# Patient Record
Sex: FEMALE | Race: WHITE | Employment: FULL TIME | URBAN - METROPOLITAN AREA
[De-identification: names, ages, dates, MRNs, and addresses within clinical notes are randomized per-mention and may not be internally consistent; named-entity substitution may affect disease eponyms.]

---

## 2019-07-29 ENCOUNTER — HOSPITAL ENCOUNTER (EMERGENCY)
Facility: CLINIC | Age: 24
Discharge: HOME | End: 2019-07-29
Attending: EMERGENCY MEDICINE | Admitting: EMERGENCY MEDICINE
Payer: COMMERCIAL

## 2019-07-29 ENCOUNTER — APPOINTMENT (OUTPATIENT)
Dept: GENERAL RADIOLOGY | Facility: CLINIC | Age: 24
End: 2019-07-29
Attending: EMERGENCY MEDICINE
Payer: COMMERCIAL

## 2019-07-29 VITALS
DIASTOLIC BLOOD PRESSURE: 79 MMHG | SYSTOLIC BLOOD PRESSURE: 130 MMHG | HEART RATE: 112 BPM | TEMPERATURE: 98.8 F | OXYGEN SATURATION: 100 % | RESPIRATION RATE: 16 BRPM

## 2019-07-29 DIAGNOSIS — S93.402A SPRAIN OF LEFT ANKLE, UNSPECIFIED LIGAMENT, INITIAL ENCOUNTER: ICD-10-CM

## 2019-07-29 PROCEDURE — 99284 EMERGENCY DEPT VISIT MOD MDM: CPT

## 2019-07-29 PROCEDURE — 73630 X-RAY EXAM OF FOOT: CPT | Mod: LT

## 2019-07-29 PROCEDURE — 73610 X-RAY EXAM OF ANKLE: CPT | Mod: LT

## 2019-07-29 PROCEDURE — 25000132 ZZH RX MED GY IP 250 OP 250 PS 637: Performed by: EMERGENCY MEDICINE

## 2019-07-29 RX ORDER — NAPROXEN 500 MG/1
500 TABLET ORAL 2 TIMES DAILY PRN
Qty: 20 TABLET | Refills: 0 | Status: SHIPPED | OUTPATIENT
Start: 2019-07-29 | End: 2019-08-06

## 2019-07-29 RX ORDER — IBUPROFEN 800 MG/1
800 TABLET, FILM COATED ORAL ONCE
Status: COMPLETED | OUTPATIENT
Start: 2019-07-29 | End: 2019-07-29

## 2019-07-29 RX ADMIN — IBUPROFEN 800 MG: 800 TABLET ORAL at 01:09

## 2019-07-29 NOTE — ED AVS SNAPSHOT
[facility] Emergency Department  [address]  [facility] [address]  Phone:  [phone]  Fax:  [phone]                                    Tiara Zafar   MRN: [id number]    Department:  [facility] Emergency Department   Date of Visit:  7/29/2019           After Visit Summary Signature Page    I have received my discharge instructions, and my questions have been answered. I have discussed any challenges I see with this plan with the nurse or doctor.    ..........................................................................................................................................  Patient/Patient Representative Signature      ..........................................................................................................................................  Patient Representative Print Name and Relationship to Patient    ..................................................               ................................................  Date                                   Time    ..........................................................................................................................................  Reviewed by Signature/Title    ...................................................              ..............................................  Date                                               Time          22EPIC Rev 08/18

## 2019-07-29 NOTE — DISCHARGE INSTRUCTIONS
Please make an appointment to follow up with [facility] [phone] in 7-10 days if not improving.    Discharge Instructions  Ankle Sprain    An ankle sprain is a stretching or tearing of a ligament around your ankle joint. In most cases, we recommend resting the ankle for about 3 days, followed by return to activity. Some severe sprains need longer periods of rest, or can require a cast or boot to immobilize them.    Generally, every Emergency Department visit should have a follow-up clinic visit with either a primary or a specialty clinic/provider. Please follow-up as instructed by your emergency provider today.    Return to the Emergency Department if:  Your pain is much worse, or if there is pain in a new area.  Your foot or leg becomes pale, cool, blue, or numb or tingling.  There is anything concerning to you about how your ankle looks.  Any splint or device is feeling too tight, causing pain, or rubbing into your skin.    Follow-up with your provider:  As recommended by your emergency provider.  If your ankle is not back to normal within about 1 week.  If you are involved in significant athletic activities.        Treatment:  Apply ice your injured area for 15 minutes at a time, at least 3 times a day for the first 1-2 days. Use a cloth between the ice bag and your skin to prevent frostbite.   Do not sleep with an ice pack or heating pad on, since this can cause burns or skin injury.  Raise the injured area above the level of your heart as much as possible in the first 1-2 days.  Pain medications -- You may take a pain medication such as Tylenol  (acetaminophen), Advil , Nuprin  (ibuprofen) or Aleve  (naproxen).  Splint. We often give a stirrup-shaped ankle splint to support your ankle and prevent it from turning again. Wear this all the time for the first 3-5 days, and then as directed by your provider.  Crutches. If you cannot put weight on the ankle without a lot of pain, we recommend  crutches. You can put as much weight on the ankle as possible without severe pain.   Compression. An elastic bandage (Ace  wrap) can help with pain and swelling. Remove this at least twice a day, and leave it off for several hours if you develop swelling of the foot.   Exercises.  Movements, like rotating the foot in circles, should be started when swelling improves.   If you were given a prescription for medicine here today, be sure to read all of the information (including the package insert) that comes with your prescription.  This will include important information about the medicine, its side effects, and any warnings that you need to know about.  The pharmacist who fills the prescription can provide more information and answer questions you may have about the medicine.  If you have questions or concerns that the pharmacist cannot address, please call or return to the Emergency Department.  Remember that you can always come back to the Emergency Department if you are not able to see your regular provider in the amount of time listed above, if you get any new symptoms, or if there is anything that worries you.

## 2019-07-29 NOTE — ED PROVIDER NOTES
History     Chief Complaint:  Ankle Pain    HPI   Tiara Zafar is a 23 year old female who presents to the emergency department today for evaluation of left ankle pain. The patient was playing baseball tonight. She went to catch a ball and twisted her ankle in a dorsiflexion style. She has pain to both sides of her ankles which radiates up the leg. She was able to walk with limping earlier tonight, but now cannot bear weight now. She broke the same ankle a few years ago and had to have surgery done on it while in [place].     Allergies:  No Known Drug Allergies    Medications:    Medications reviewed. No pertinent medications.    Past Medical History:    History reviewed. No pertinent medical history.    Past Surgical History:    History reviewed. No pertinent surgical history.    Family History:    History reviewed. No pertinent family history.    Social History:  The patient was accompanied to the ED by her sister.    Review of Systems   Musculoskeletal:        Ankle pain (L)   All other systems reviewed and are negative.    Physical Exam     Patient Vitals for the past 24 hrs:   BP Temp Temp src Pulse Heart Rate Resp SpO2   07/29/19 0053 130/79 98.8  F (37.1  C) Oral 112 112 16 100 %      Physical Exam    Constitutional:  Pleasant, age appropriate.   EYES:   Conjunctiva normal.  NECK:    Supple, no meningismus.   CV:     Regular rate and rhythm     No murmurs, rubs or gallops.       2+ DP pulses bilateral.  PULM:    Clear to auscultation bilateral.       No respiratory distress.      No wheezing, rales or stridor.  ABD:    Soft, non-tender, non-distended.  No pulsatile masses.       No rebound or guarding.  MSK:     Left lower extremity : No gross deformity.       No tenderness to the proximal fibula.       Sosa test within normal limits.        Achilles tendon is palpated without tenderness and without defect.           Tenderness to the lateral malleolus with mild soft tissue  swelling.        Mild bony tenderness to the dorsum of the proximal aspect of the foot  LYMPH:   No cervical lymphadenopathy.  NEURO:   Alert.      Left lower extremity:      Strength and sensation intact.  SKIN:    Warm, dry and intact.       No rash.  PSYCH:    Mood is good and affect is appropriate.      Emergency Department Course     Imaging:  Radiology findings were communicated with the patient who voiced understanding of the findings.    Foot XR, G/E 3 views, left  No fracture or other acute findings.  Reading per radiology    XR Ankle Left G/E 3 Views  No acute fracture or dislocation. Old plate and screw  fixation of the distal left fibula with a small adjacent corticated  ossicle distally.  Reading per radiology    Interventions:  0109 Ibuprofen 800 mg PO    Emergency Department Course:    0050 Nursing notes and vitals reviewed.    0055 I performed an exam of the patient as documented above.     0119 The patient was sent for a XR while in the emergency department, results above.      0139 I personally reviewed the imaging results with the patient and answered all related questions prior to discharge.    Impression & Plan      Medical Decision Making:  Tiara Zafar is a 23 year old female who presents to the emergency department today for evaluation of acute left ankle pain after dorsiflexion injury.  Pain is most severe at the lateral malleolus.  Achilles tendon intact.  Plain films are unremarkable for acute fracture dislocation.  Findings are most consistent with ligamentous sprain.  Patient will be provided crutches with weightbearing as tolerated.  Recommended scheduled anti-inflammatories, RICE therapy and follow-up with PCP in 1 week if symptoms not improved.    Diagnosis:    ICD-10-CM    1. Sprain of left ankle, unspecified ligament, initial encounter S93.402A      Disposition:   Discharge    Discharge Medications:  New Prescriptions    NAPROXEN (NAPROSYN) 500 MG TABLET    Take 1  tablet (500 mg) by mouth 2 times daily as needed for moderate pain     Scribe Disclosure:  I, Ang Orlando, am serving as a scribe at 1:01 AM on 7/29/2019 to document services personally performed by Norbert Pedraza MD based on my observations and the provider's statements to me.    [facility] EMERGENCY DEPARTMENT       Norbert Pedraza MD  07/29/19 0145

## 2023-04-05 ENCOUNTER — APPOINTMENT (OUTPATIENT)
Dept: INTERPRETER SERVICES | Facility: CLINIC | Age: 28
End: 2023-04-05
Payer: COMMERCIAL